# Patient Record
Sex: FEMALE
[De-identification: names, ages, dates, MRNs, and addresses within clinical notes are randomized per-mention and may not be internally consistent; named-entity substitution may affect disease eponyms.]

---

## 2018-12-12 ENCOUNTER — RX ONLY (OUTPATIENT)
Age: 48
Setting detail: RX ONLY
End: 2018-12-12

## 2018-12-12 ENCOUNTER — APPOINTMENT (RX ONLY)
Dept: URBAN - METROPOLITAN AREA CLINIC 23 | Facility: CLINIC | Age: 48
Setting detail: DERMATOLOGY
End: 2018-12-12

## 2018-12-12 DIAGNOSIS — Z41.9 ENCOUNTER FOR PROCEDURE FOR PURPOSES OTHER THAN REMEDYING HEALTH STATE, UNSPECIFIED: ICD-10-CM

## 2018-12-12 PROCEDURE — ? FILLERS

## 2018-12-12 PROCEDURE — ? RECOMMENDATIONS

## 2018-12-12 RX ORDER — GLYCOPYRRONIUM 2.4 G/100G
CLOTH TOPICAL
Qty: 1 | Refills: 2 | Status: CANCELLED
Stop reason: CLARIF

## 2018-12-12 RX ORDER — GLYCOPYRRONIUM 2.4 G/100G
CLOTH TOPICAL QD
Qty: 1 | Refills: 2 | Status: ERX | COMMUNITY
Start: 2018-12-12

## 2018-12-12 NOTE — PROCEDURE: RECOMMENDATIONS
Recommendations (Free Text): Discussed Thread, Profound quoted pt $4500, Fillers, Brightening pads 6%, [address] dark spots quoted pt  $350 for spots or $900 for full face.
Detail Level: Zone

## 2018-12-12 NOTE — PROCEDURE: FILLERS
Nasolabial Folds Filler Volume In Cc: 0
Post-Care Instructions: Patient instructed to apply ice to reduce swelling.
Additional Anesthesia Volume In Cc: 6
Include Cannula Size?: 25G
Additional Area 1 Location: marionette lines, Oral commissure
Marionette Lines Filler  Volume In Cc: 0.5
Lot #: [id number]
Additional Area 2 Location: lateral cheeks, lateral Jawline
Include Cannula Length?: 1.5 inch
Additional Area 2 Volume In Cc: 1
Include Cannula Information In Note?: No
Expiration Date (Month Year): 04/30/2021
Additional Area 3 Location: lower cheeks, smile lines
Filler: Voluma
Detail Level: Zone
Additional Area 4 Location: lips,
Additional Area 1 Location: upper lips and using the cannula to tear troughs
Include Cannula Brand?: DermaSculpt
Price (Use Numbers Only, No Special Characters Or $): 0.00
Additional Area 2 Location: lateral jawline, Left marionette
Additional Area 5 Location: marionettes, oral commissures
Additional Area 3 Location: lateral jaw
Anesthesia Type: 1% lidocaine with epinephrine
Lot #: [id number]
Filler: Restylane
Map Statment: See [address] for Complete Details
Expiration Date (Month Year): 01/30/2020
Consent: Written consent obtained. Risks include but not limited to bruising, beading, irregular texture, ulceration, infection, allergic reaction, scar formation, incomplete augmentation, temporary nature, procedural pain.

## 2018-12-14 ENCOUNTER — RX ONLY (OUTPATIENT)
Age: 48
Setting detail: RX ONLY
End: 2018-12-14

## 2018-12-14 RX ORDER — ALUMINUM CHLORIDE 15 G/100ML
SOLUTION TOPICAL
Qty: 1 | Refills: 2 | Status: CANCELLED
Stop reason: CLARIF

## 2018-12-14 RX ORDER — ALUMINUM CHLORIDE
LIQUID (ML) TOPICAL
Qty: 1 | Refills: 2 | Status: ERX | COMMUNITY
Start: 2018-12-14

## 2019-10-10 ENCOUNTER — APPOINTMENT (RX ONLY)
Dept: URBAN - METROPOLITAN AREA CLINIC 23 | Facility: CLINIC | Age: 49
Setting detail: DERMATOLOGY
End: 2019-10-10

## 2019-10-10 DIAGNOSIS — L72.8 OTHER FOLLICULAR CYSTS OF THE SKIN AND SUBCUTANEOUS TISSUE: ICD-10-CM

## 2019-10-10 PROCEDURE — 11900 INJECT SKIN LESIONS </W 7: CPT

## 2019-10-10 PROCEDURE — ? INTRALESIONAL KENALOG

## 2019-10-10 ASSESSMENT — LOCATION ZONE DERM: LOCATION ZONE: FACE

## 2019-10-10 ASSESSMENT — LOCATION DETAILED DESCRIPTION DERM: LOCATION DETAILED: LEFT INFERIOR MEDIAL MALAR CHEEK

## 2019-10-10 ASSESSMENT — LOCATION SIMPLE DESCRIPTION DERM: LOCATION SIMPLE: LEFT CHEEK

## 2019-10-10 NOTE — PROCEDURE: INTRALESIONAL KENALOG
X Size Of Lesion In Cm (Optional): 0
Ndc# For Kenalog Only: 5346-5204-42
Consent: The risks of atrophy were reviewed with the patient.
Detail Level: Detailed
Concentration Of Solution Injected (Mg/Ml): 2.5
Kenalog Preparation: Kenalog
Administered By (Optional): Dr. Abhishek Mendoza
Include Z78.9 (Other Specified Conditions Influencing Health Status) As An Associated Diagnosis?: No
Total Volume Injected (Ccs- Only Use Numbers And Decimals): 0.2
Lot # (Optional): [id number]
Expiration Date (Optional): 03/2021
Medical Necessity Clause: This procedure was medically necessary because the lesions that were treated were: